# Patient Record
Sex: MALE | URBAN - METROPOLITAN AREA
[De-identification: names, ages, dates, MRNs, and addresses within clinical notes are randomized per-mention and may not be internally consistent; named-entity substitution may affect disease eponyms.]

---

## 2018-02-07 ENCOUNTER — OFFICE VISIT (OUTPATIENT)
Dept: FAMILY MEDICINE CLINIC | Age: 53
End: 2018-02-07

## 2018-02-07 VITALS
TEMPERATURE: 101.2 F | DIASTOLIC BLOOD PRESSURE: 93 MMHG | OXYGEN SATURATION: 97 % | RESPIRATION RATE: 17 BRPM | SYSTOLIC BLOOD PRESSURE: 122 MMHG | HEART RATE: 113 BPM | BODY MASS INDEX: 25.22 KG/M2 | HEIGHT: 72 IN | WEIGHT: 186.2 LBS

## 2018-02-07 DIAGNOSIS — R68.89 FLU-LIKE SYMPTOMS: Primary | ICD-10-CM

## 2018-02-07 LAB
QUICKVUE INFLUENZA TEST: POSITIVE
VALID INTERNAL CONTROL?: YES

## 2018-02-07 RX ORDER — OSELTAMIVIR PHOSPHATE 75 MG/1
75 CAPSULE ORAL 2 TIMES DAILY
Qty: 10 CAP | Refills: 0 | Status: SHIPPED | OUTPATIENT
Start: 2018-02-07 | End: 2018-02-12

## 2018-02-07 NOTE — PROGRESS NOTES
Harris Curiel is a 46 y.o.  male and presents with 1-2 days of high fever and mild body aches and pain. He did not have a flu shot this year. He denies any chronic URI or LRI. Chief Complaint   Patient presents with   [facility] Establish Care    Headache     Subjective: Additional Concerns: none    No Known Allergies  History reviewed. No pertinent past medical history. History reviewed. No pertinent surgical history. History reviewed. No pertinent family history. Social History   Substance Use Topics    Smoking status: Never Smoker    Smokeless tobacco: Never Used    Alcohol use No     ROS     General: negative for - chills, fatigue, positive for fever, no weight change  Endo: negative for - hot flashes, polydipsia/polyuria or temperature intolerance  Resp: positive for - cough, no shortness of breath or wheezing  CV: negative for - chest pain, edema or palpitations  MSK: negative for - joint pain, joint swelling, positive for muscle pain    Objective:  Vitals:    02/07/18 1030   BP: (!) 122/93   Pulse: (!) 113   Resp: 17   Temp: (!) 101.2 °F (38.4 °C)   TempSrc: Oral   SpO2: 97%   Weight: 186 lb 3.2 oz (84.5 kg)   Height: 6' (1.829 m)   PainSc:   9     PE    Alert, well appearing, and in no distress, oriented to person, place, and time and normal appearing weight  General appearance - alert, well appearing, and in no distress and oriented to person, place, and time  Mental status - alert, oriented to person, place, and time, normal mood, behavior, speech, dress, motor activity, and thought processes  Chest - clear to auscultation, no wheezes, rales or rhonchi, symmetric air entry  Heart - normal rate, regular rhythm, normal S1, S2, no murmurs, rubs, clicks or gallops  Extremities - peripheral pulses normal, no pedal edema, no clubbing or cyanosis    LABS   No results found for any previous visit.     TESTS  Flu A positive    Assessment/Plan:     Acute URI possible flu by symptoms - Symptomatic treatment for now. Tamiflu given for positive Flu A  testing. Patient given a few days off in a letter  For work. There are no diagnoses linked to this encounter. Lab review: orders written for new lab studies as appropriate; see orders. I have discussed the diagnosis with the patient and the intended plan as seen in the above orders. The patient has received an after-visit summary and questions were answered concerning future plans. I have discussed medication side effects and warnings with the patient as well. I have reviewed the plan of care with the patient, accepted their input and they are in agreement with the treatment goals. F/U as needed.      Milly Delong MD

## 2018-02-07 NOTE — PATIENT INSTRUCTIONS

## 2018-02-07 NOTE — LETTER
NOTIFICATION RETURN TO WORK / SCHOOL 
 
2/7/2018 10:42 AM 
 
Mr. Cheri Bell [address] [facility] [address] To Whom It May Concern: 
 
Cheri Bell is currently under the care of [address] [place] [address]. He will return to work/school on: Saturday, 2/10/2018 If there are questions or concerns please have the patient contact our office. Sincerely, Glynn Sousa MD